# Patient Record
Sex: MALE | Race: OTHER | HISPANIC OR LATINO | Employment: FULL TIME | URBAN - METROPOLITAN AREA
[De-identification: names, ages, dates, MRNs, and addresses within clinical notes are randomized per-mention and may not be internally consistent; named-entity substitution may affect disease eponyms.]

---

## 2022-07-29 ENCOUNTER — OFFICE VISIT (OUTPATIENT)
Dept: INTERNAL MEDICINE CLINIC | Facility: CLINIC | Age: 42
End: 2022-07-29

## 2022-07-29 VITALS — WEIGHT: 174 LBS | HEIGHT: 64 IN | BODY MASS INDEX: 29.71 KG/M2

## 2022-07-29 DIAGNOSIS — Z00.00 ROUTINE HISTORY AND PHYSICAL EXAMINATION OF ADULT: Primary | ICD-10-CM

## 2022-07-29 LAB
SL AMB  POCT GLUCOSE, UA: ABNORMAL
SL AMB LEUKOCYTE ESTERASE,UA: ABNORMAL
SL AMB POCT BILIRUBIN,UA: ABNORMAL
SL AMB POCT BLOOD,UA: ABNORMAL
SL AMB POCT CLARITY,UA: ABNORMAL
SL AMB POCT COLOR,UA: ABNORMAL
SL AMB POCT KETONES,UA: ABNORMAL
SL AMB POCT NITRITE,UA: ABNORMAL
SL AMB POCT PH,UA: ABNORMAL
SL AMB POCT SPECIFIC GRAVITY,UA: ABNORMAL
SL AMB POCT URINE PROTEIN: ABNORMAL
SL AMB POCT UROBILINOGEN: ABNORMAL

## 2022-07-29 PROCEDURE — 99499CD: Performed by: INTERNAL MEDICINE

## 2022-07-29 NOTE — PROGRESS NOTES
Medical Examination    Amadeo Blevins is a 23 y o  male who presents today for a  fitness determination physical exam  The patient reports no problems  The following portions of the patient's history were reviewed and updated as appropriate: allergies, current medications, past family history, past medical history, past social history, past surgical history and problem list   Review of Systems  Pertinent items are noted in HPI       Objective     Vision:   Uncorrected Corrected Horizontal Field of Vision   Right Eye 20/20 20/20 90 degrees   Left Eye  20/20 20/20 90 degrees   Both Eyes   83/58      Applicant can recognize and distinguish among traffic control signals and devices showing standard red, green, and vielka colors  Monocular Vision?: No      Hearin Hz 1000 Hz 2000 Hz 4000 Hz   Right Ear  hears 40 dB hears 40 dB hears 40 dB hears 40 dB   Left Ear  hears 40 dB hears 40 dB hears 40 dB hears 40 dB          There were no vitals taken for this visit      General Appearance:    Alert, cooperative, no distress, appears stated age   Head:    Normocephalic, without obvious abnormality, atraumatic   Eyes:    PERRL, conjunctiva/corneas clear, EOM's intact, fundi     benign, both eyes        Ears:    Normal TM's and external ear canals, both ears   Nose:   Nares normal, septum midline, mucosa normal, no drainage    or sinus tenderness   Throat:   Lips, mucosa, and tongue normal; teeth and gums normal   Neck:   Supple, symmetrical, trachea midline, no adenopathy;        thyroid:  No enlargement/tenderness/nodules; no carotid    bruit or JVD   Back:     Symmetric, no curvature, ROM normal, no CVA tenderness   Lungs:     Clear to auscultation bilaterally, respirations unlabored   Chest wall:    No tenderness or deformity   Heart:    Regular rate and rhythm, S1 and S2 normal, no murmur, rub   or gallop   Abdomen:     Soft, non-tender, bowel sounds active all four quadrants,     no masses, no organomegaly   Genitalia:    Normal male without lesion, discharge or tenderness   Rectal:    Normal tone, normal prostate, no masses or tenderness;    guaiac negative stool   Extremities:   Extremities normal, atraumatic, no cyanosis or edema   Pulses:   2+ and symmetric all extremities   Skin:   Skin color, texture, turgor normal, no rashes or lesions   Lymph nodes:   Cervical, supraclavicular, and axillary nodes normal   Neurologic:   CNII-XII intact  Normal strength, sensation and reflexes       throughout       Labs:  No results found for: Sherie Reynoso, WILSONUR, GLUCOSEU    Assessment/Plan     Healthy male exam    Meets standards in 52  41;  qualifies for 2 year certificate       Medical examiners certificate completed and printed  Return as needed  Kali Salgado

## 2022-10-11 PROBLEM — Z00.00 ROUTINE HISTORY AND PHYSICAL EXAMINATION OF ADULT: Status: RESOLVED | Noted: 2022-07-29 | Resolved: 2022-10-11
